# Patient Record
Sex: MALE | Employment: FULL TIME | URBAN - METROPOLITAN AREA
[De-identification: names, ages, dates, MRNs, and addresses within clinical notes are randomized per-mention and may not be internally consistent; named-entity substitution may affect disease eponyms.]

---

## 2020-05-27 ENCOUNTER — HOSPITAL ENCOUNTER (OUTPATIENT)
Dept: PHYSICAL THERAPY | Age: 30
Discharge: HOME | End: 2020-05-27
Payer: OTHER GOVERNMENT

## 2020-05-27 ENCOUNTER — OFFICE VISIT (OUTPATIENT)
Dept: ORTHOPEDIC SURGERY | Age: 30
End: 2020-05-27

## 2020-05-27 VITALS — WEIGHT: 210 LBS | HEIGHT: 75 IN | BODY MASS INDEX: 26.11 KG/M2 | TEMPERATURE: 96.9 F

## 2020-05-27 DIAGNOSIS — M25.562 LEFT KNEE PAIN, UNSPECIFIED CHRONICITY: ICD-10-CM

## 2020-05-27 DIAGNOSIS — M76.52 PATELLAR TENDONITIS OF LEFT KNEE: Primary | ICD-10-CM

## 2020-05-27 PROCEDURE — 97110 THERAPEUTIC EXERCISES: CPT

## 2020-05-27 PROCEDURE — 97161 PT EVAL LOW COMPLEX 20 MIN: CPT

## 2020-05-27 PROCEDURE — 97535 SELF CARE MNGMENT TRAINING: CPT

## 2020-05-27 RX ORDER — CELECOXIB 200 MG/1
200 CAPSULE ORAL 2 TIMES DAILY
Qty: 60 CAP | Refills: 0 | Status: SHIPPED | OUTPATIENT
Start: 2020-05-27

## 2020-05-27 NOTE — PROGRESS NOTES
PT DAILY TREATMENT NOTE/KNEE EVAL 10-18    Patient Name: Sherrie Devine  Date:2020  : 1990  [x]  Patient  Verified  Payor: Jono Blackburn / Plan: Lexi Vaughan 35 / Product Type: PPO /    In time:1038  Out time:1109  Total Treatment Time (min): 31  Visit #: 1 of 8    Treatment Area: Patellar tendinitis, left knee [M76.52]    SUBJECTIVE  Pain Level (0-10 scale): worst: 6/10, lowest: 1-2/10, currently: 210  [x]constant []intermittent []improving [x]worsening []no change since onset    Any medication changes, allergies to medications, adverse drug reactions, diagnosis change, or new procedure performed?: [x] No    [] Yes (see summary sheet for update)  Subjective functional status/changes:     PLOF: I with ADLs, driving, L hand dominate, full time working, physical activity daily (pool, stretches, cardio)  Limitations to PLOF: pain  Mechanism of Injury: 2020  Current symptoms/Complaints: training for [employer] started 2 months ago, L knee pain began with insidious onset after increased activity, osgood Schlatter when a kid, denies numbness/tingling, denies LBP, not keeping him up at night pain, sleeps on back, runs 2-4 miles (3-4x/week)   Aggravating: activity, knee bending, stairs (descent), lunges, squats, running (after run)  Alleviating: ice  Previous Treatment/Compliance: ice, medication  PMHx/Surgical Hx: hx of L osgood Schlatter   Work Hx: [employer]  Living Situation: apartment second floor, [address], girlfriend  Pt Goals: \"decrease pain\"  Barriers: []pain []financial []time []transportation []other  Motivation: good  Substance use: []Alcohol []Tobacco []other:   FABQ Score: []low []elevate  Cognition: A & O x 4    Other:    OBJECTIVE/EXAMINATION  Domestic Life: see above  Activity/Recreational Limitations: pain  Mobility: I   Self Care: I    15 min [x]Eval                  []Re-Eval       8 min Therapeutic Exercise:  [x] See flow sheet :   Rationale: increase ROM, increase strength and education on HEP to improve the patients ability to complete functional activities.     8 min Self care: education on decrease activity levels, education on rest day, education on ice/elevation to slowly begin implementing exercise gradually to return to prior level of function with decreased pain and to prevent future injuries           With   [x] TE   [] TA   [] neuro   [] other: Patient Education: [x] Review HEP    [] Progressed/Changed HEP based on:   [] positioning   [] body mechanics   [] transfers   [] heat/ice application    [] other:      Other Objective/Functional Measures:     Physical Therapy Evaluation - Knee    Gait:  [x] Normal    [] Abnormal    [] Antalgic    [] NWB    Device:    Describe:    ROM / Strength  [] Unable to assess                  Strength (1-5)    Left Right   Hip Flexion 5 5    Extension 4 4    Abduction 4 4+    Adduction 4+ 4+   Knee Flexion 4+ 5    Extension 4+ p 5   Ankle Plantarflexion 5 5    Dorsiflexion 5 5       Flexibility: [] Unable to assess at this time  Hamstrings:    (L) Tightness= [] WNL   [] Min   [x] Mod   [] Severe    (R) Tightness= [] WNL   [x] Min   [] Mod   [] Severe  Quadriceps:    (L) Tightness= [] WNL   [x] Min   [] Mod   [] Severe    (R) Tightness= [] WNL   [x] Min   [] Mod   [] Severe  Gastroc:      (L) Tightness= [] WNL   [] Min   [x] Mod   [] Severe    (R) Tightness= [] WNL   [] Min   [x] Mod   [] Severe  Other:    Palpation:   Neg/Pos  Neg/Pos  Neg/Pos   Joint Line - Quad tendon - Patellar ligament  + L   Patella - Fibular head - Pes Anserinus -   Tibial tubercle - Hamstring tendons - Infrapatellar fat pad      Optional Tests:  Patellar Positioning (Static)   []L [x]R Normal   Patellar Mobility WFL      Lachmans  [] Neg    [] Pos Posterior Drawer [x] Neg    [] Pos  Pivot Shift  [] Neg    [] Pos Posterior Sag  [] Neg    [] Pos  MARSHALL   [] Neg    [] Pos Andrei's Test [] Neg    [] Pos  ALRI   [] Neg    [] Pos Squat   [] Neg    [x] Pos  Valgus@ 0 Degrees [] Neg    [] Pos ZekeAlfredo [x] Neg    [] Pos  Valgus@ 30 Degrees [x] Neg    [] Pos Patellar Apprehension [] Neg    [] Pos  Varus@ 0 Degrees [] Neg    [] Pos Arthur's Compression [] Neg    [] Pos  Varus@ 30 Degrees [x] Neg    [] Pos Ely's Test  [] Neg    [] Pos  Apley's Compression [x] Neg    [] Pos Elo's Test  [] Neg    [] Pos  Apley's Distraction [] Neg    [] Pos Stroke Test  [] Neg    [] Pos   Anterior Drawer [x] Neg    [] Pos Fluctuation Test [] Neg    [] Pos  Other:                  [] Neg    [] Pos                 Other tests/comments: Thessaly: neg  Duck walk: neg  Air squat with knees aligned with ankles: felt better  SLS: 1x30\" ea no UE EO floor, increased sway noted on L & pain  Slight swelling noticed in patellar tendon region, no warmth felt  Clarkpiter: + R    Access Code: [email or web address]   URL: [email or web address]   Date: 05/27/2020   Prepared by: Darby Resendiz     Exercises   Supine Bridge - 10 reps - 2 sets - 1x daily - 3-5x weekly   Clamshell - 10 reps - 2 sets - 1x daily - 3-5x weekly   Sidelying Reverse Clamshell - 10 reps - 2 sets - 1x daily - 3-5x weekly   Gastroc Stretch on Wall - 3 reps - 1 sets - 30 hold - 1x daily - 7x weekly   Supine Quad Set - 10 reps - 2 sets - 5 hold - 1x daily - 3-5x weekly   Supine Isometric Hamstring Set - 10 reps - 2 sets - 5 hold - 1x daily - 3-5x weekly   Long Sitting Calf Stretch with Strap - 3 reps - 1 sets - 30 hold - 1x daily - 7x weekly   Soleus Stretch on Wall - 3 reps - 1 sets - 30 hold - 1x daily - 7x weekly   Standing Hamstring Stretch on Chair - 3 reps - 1 sets - 30 hold - 1x daily - 7x weekly   Seated Hamstring Stretch - 3 reps - 1 sets - 30 hold - 1x daily - 7x weekly   Small Range Straight Leg Raise - 10 reps - 2 sets - 1x daily - 7x weekly   Sidelying Hip Abduction - 10 reps - 2 sets - 1x daily - 7x weekly   Prone Hip Extension - 10 reps - 2 sets - 1x daily - 7x weekly   Standing ITB Stretch - 3 reps - 1 sets - 30 hold - 1x daily - 7x weekly   Patient Education   Acute Knee Pain   Ice       Pain Level (0-10 scale) post treatment: \"worse\"    ASSESSMENT/Changes in Function: Pt is a 35 y/o male presenting to outpatient physical therapy with complaints of L knee pain that began with insidious onset after increased physical activity 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 After PT evaluation, findings and symptoms are consistent with patellar tendonitis. Pt is a good candidate for skilled PT interventions with plan to progress and modify therapeutic interventions to address prevention interventions, stabilization training, plymometric training, strength deficits, and apatient education to increase patient's ability to complete functional and community activities with decreased pain. Written HEP was completed and hardcopy was given to patient to complete daily at home; pt verbalized understanding. Discussed decreasing activity levels and taking one full rest day to recover and slowly implementing activity gradually; pt voiced understanding. [x]  See Plan of Care  []  See progress note/recertification  []  See Discharge Summary         Progress towards goals / Updated goals:   Short term goals to be completed in 2 weeks  1. Pt will be compliant and I with HEP to help reduce pain and improve abilities to complete ADLs. EVAL: written hardcopy given and initiated exercises   CURRENT:  2. Pt will report no higher than 4/10 pain to improve patient's ability to complete household chores   EVAL: worst 6/10   CURRENT:    Long term goals to be completed in 4 weeks  1. Pt will demonstrate 66 FOTO score to improve abilities to functional activities. EVAL: 58   CURRENT:  2. Pt will report no higher than 1/10 pain to improve patient's ability to complete job related task   EVAL: worst 6/10   CURRENT:  3.  Pt will demonstrate a proper air squat 10 reps with decrease anterior translation of tibia to increase abilities to complete community activities. EVAL: educated on proper form with fair return demo 1x   CURRENT:  4.  Pt will report no increase in pain levels during/after 2 mile run to increase abilities to complete weekly fitness test.   EVAL pain after running and pushing through runs despite pain   CURRENT:     PLAN  [x]  Upgrade activities as tolerated     [x]  Continue plan of care  [x]  Update interventions per flow sheet       []  Discharge due to:_  []  Other:_      Fredy Salas 5/27/2020  10:37 AM

## 2020-05-27 NOTE — PROGRESS NOTES
[facility]  [address]  [place], [address]  [phone] fax    Plan of Care/ Statement of Necessity for Physical Therapy Services    Patient name: Nacho Strange Start of Care: 2020   Referral source: Arely Crenshaw,* : 1990    Medical Diagnosis: Patellar tendinitis, left knee [M76.52]  Payor: TRIP MICHEL / Plan: Lexi Vaughan 35 / Product Type: PPO /  Onset Date:2020    Treatment Diagnosis: L knee pain   Prior Hospitalization: see medical history Provider#: [id number]   Medications: Verified on Patient summary List    Comorbidities: hx of L osgood Schlatter    Prior Level of Function:  I with ADLs, driving, L hand dominate, full time working, physical activity daily (pool, stretches, cardio)     The Plan of Care and following information is based on the information from the initial evaluation. 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 After PT evaluation, findings and symptoms are consistent with patellar tendonitis. Pt is a good candidate for skilled PT interventions with plan to progress and modify therapeutic interventions to address prevention interventions, stabilization training, plymometric training, strength deficits, and apatient education to increase patient's ability to complete functional and community activities with decreased pain. Written HEP was completed and hardcopy was given to patient to complete daily at home; pt verbalized understanding. Discussed decreasing activity levels and taking one full rest day to recover and slowly implementing activity gradually; pt voiced understanding.   Evaluation Complexity History MEDIUM  Complexity : 1-2 comorbidities / personal factors will impact the outcome/ POC ; Examination MEDIUM Complexity : 3 Standardized tests and measures addressing body structure, function, activity limitation and / or participation in recreation  ;Presentation LOW Complexity : Stable, uncomplicated  ;Clinical Decision Making MEDIUM Complexity : FOTO score of 26-74  Overall Complexity Rating: LOW   Problem List: pain affecting function, decrease strength, impaired gait/ balance, decrease ADL/ functional abilitiies, decrease activity tolerance and decrease flexibility/ joint mobility   Treatment Plan may include any combination of the following: Therapeutic exercise, Therapeutic activities, Neuromuscular re-education, Physical agent/modality, Gait/balance training, Manual therapy, Patient education, Self Care training, Functional mobility training, Home safety training and Stair training  Patient / Family readiness to learn indicated by: asking questions, trying to perform skills and interest  Persons(s) to be included in education: patient (P)  Barriers to Learning/Limitations: None  Patient Goal (s): decrease pain  Patient Self Reported Health Status: excellent  Rehabilitation Potential: excellent    Short term goals to be completed in 2 weeks  1. Pt will be compliant and I with HEP to help reduce pain and improve abilities to complete ADLs. EVAL: written hardcopy given and initiated exercises              CURRENT:  2. Pt will report no higher than 4/10 pain to improve patient's ability to complete household chores              EVAL: worst 6/10              CURRENT:     Long term goals to be completed in 4 weeks  1. Pt will demonstrate 66 FOTO score to improve abilities to functional activities. EVAL: 58              CURRENT:  2. Pt will report no higher than 1/10 pain to improve patient's ability to complete job related task              EVAL: worst 6/10              CURRENT:  3.  Pt will demonstrate a proper air squat 10 reps with decrease anterior translation of tibia to increase abilities to complete community activities. EVAL: educated on proper form with fair return demo 1x              CURRENT:  4. Pt will report no increase in pain levels during/after 2 mile run to increase abilities to complete weekly fitness test.              EVAL pain after running and pushing through runs despite pain              CURRENT:    Frequency / Duration: Patient to be seen 2-3 times per week for 4 weeks. Patient/ Caregiver education and instruction: Diagnosis, prognosis, self care, activity modification and exercises   [x]  Plan of care has been reviewed with ELVIA James 5/27/2020 11:17 AM    ________________________________________________________________________    I certify that the above Therapy Services are being furnished while the patient is under my care. I agree with the treatment plan and certify that this therapy is necessary.     Physician's Signature:____________Date:_________TIME:________    ** Signature, Date and Time must be completed for valid certification **    Please sign and return to [facility] [address]  [address]  [place], [address]  [phone] fax

## 2020-05-27 NOTE — PROGRESS NOTES
Shruthi Barrera  1990   Chief Complaint   Patient presents with    Knee Pain     left knee pain        HISTORY OF PRESENT ILLNESS  Shruthi Barrera is a 34 y.o. male who presents today for evaluation of left knee pain. He rates his pain 3/10 today. Pain has been present for about 1 month. He has been training for [employer] and [employer]. He states the program starts in July. Pt has been doing a lot of running, lunges, squats, swimming, lifting. Pain with stairs, lunges. Has tried icing his knee. Patient describes the pain as throbbing and grinding, popping, stiff that is Intermittent in nature. Symptoms are worse with stairs, Activity and is better with  Ice. Associated symptoms include grinding and hamstring tightness. Since problem started, it: is unchanged. Pain does not wake patient up at night. Has taken no meds for the problem. Has tried following treatments: Injections:NO; Brace:NO; Therapy:NO; Cane/Crutch:NO       No Known Allergies     History reviewed. No pertinent past medical history.    Social History     Socioeconomic History    Marital status: UNKNOWN     Spouse name: Not on file    Number of children: Not on file    Years of education: Not on file    Highest education level: Not on file   Occupational History    Not on file   Social Needs    Financial resource strain: Not on file    Food insecurity     Worry: Not on file     Inability: Not on file    Transportation needs     Medical: Not on file     Non-medical: Not on file   Tobacco Use    Smoking status: Never Smoker    Smokeless tobacco: Never Used   Substance and Sexual Activity    Alcohol use: Not on file    Drug use: Not on file    Sexual activity: Not on file   Lifestyle    Physical activity     Days per week: Not on file     Minutes per session: Not on file    Stress: Not on file   Relationships    Social connections     Talks on phone: Not on file     Gets together: Not on file     Attends Congregation service: Not on file     Active member of club or organization: Not on file     Attends meetings of clubs or organizations: Not on file     Relationship status: Not on file    Intimate partner violence     Fear of current or ex partner: Not on file     Emotionally abused: Not on file     Physically abused: Not on file     Forced sexual activity: Not on file   Other Topics Concern    Not on file   Social History Narrative    Not on file      History reviewed. No pertinent surgical history. History reviewed. No pertinent family history. No current outpatient medications on file. No current facility-administered medications for this visit. REVIEW OF SYSTEM   Patient denies: Weight loss, Fever/Chills, HA, Visual changes, Fatigue, Chest pain, SOB, Abdominal pain, N/V/D/C, Blood in stool or urine, Edema. Pertinent positive as above in HPI. All others were negative    PHYSICAL EXAM:   Visit Vitals  Temp 96.9 °F (36.1 °C) (Oral)   Ht 6' 3\" (1.905 m)   Wt 210 lb (95.3 kg)   BMI 26.25 kg/m²     The patient is a well-developed, well-nourished male   in no acute distress. The patient is alert and oriented times three. The patient is alert and oriented times three. Mood and affect are normal.  LYMPHATIC: lymph nodes are not enlarged and are within normal limits  SKIN: normal in color and non tender to palpation. There are no bruises or abrasions noted. NEUROLOGICAL: Motor sensory exam is within normal limits. Reflexes are equal bilaterally.  There is normal sensation to pinprick and light touch  MUSCULOSKELETAL:   Examination Left knee   Skin Intact   Range of motion 0-130   Effusion -   Medial joint line tenderness -   Lateral joint line tenderness -   Tenderness Pes Bursa -   Tenderness insertion MCL -   Tenderness insertion LCL -   Stormys -   Patella crepitus -   Patella grind -   Lachman -   Pivot shift -   Anterior drawer -   Posterior drawer -   Varus stress -   Valgus stress -   Neurovascular Intact Calf Swelling and Tenderness to Palpation -   Iliana's Test -   Hamstring Cord Tightness  significant   Marked tenderness over the inferior pole of the patella    IMAGING: XR of left knee obtained in the office dated 5/27/2020 was reviewed and read by Dr. Alvarez Males: Evidence of old Osgood-Schlatter's disease in tibial tubercle       IMPRESSION:      ICD-10-CM ICD-9-CM    1. Patellar tendonitis of left knee M76.52 726.64 REFERRAL TO PHYSICAL THERAPY      celecoxib (CeleBREX) 200 mg capsule   2. Left knee pain, unspecified chronicity M25.562 719.46 AMB POC XRAY, KNEE; 1/2 VIEWS        PLAN:  1. Pt presents today with left knee pain due to patellar tendonitis and I would like for him to try a few sessions of PT. He was also given hamstring stretches and a prescription for Celebrex in the office today. I would like to see him back in the office in 2 weeks. Will consider getting an MRI if pain persists. I advised him that he may need to modify his activities over the next week. Risk factors include: n/a  2. No ultrasound exam indicated today  3. No cortisone injection indicated today   4. Yes Physical/Occupational Therapy indicated today  5. No diagnostic test indicated today:   6. No durable medical equipment indicated today  7. No referral indicated today   8. Yes medications indicated today: CELEBREX  9. No Narcotic indicated today        RTC 2 weeks       Scribed by Olga Ag [address]) as dictated by Grant Small MD    I, Dr. Grant Small, confirm that all documentation is accurate.     Grant Small M.D.   [facility]

## 2020-05-29 ENCOUNTER — HOSPITAL ENCOUNTER (OUTPATIENT)
Dept: PHYSICAL THERAPY | Age: 30
Discharge: HOME | End: 2020-05-29
Payer: OTHER GOVERNMENT

## 2020-05-29 PROCEDURE — 97110 THERAPEUTIC EXERCISES: CPT

## 2020-05-29 NOTE — PROGRESS NOTES
PT DAILY TREATMENT NOTE 10-18    Patient Name: Raji Gross  Date:2020  : 1990  [x]  Patient  Verified  Payor: Law Oliva / Plan: Lexi Vaughan 35 / Product Type: PPO /    In time: 2:58  Out time:3:55  Total Treatment Time (min): 62  Visit #: 2 of     Treatment Area: Patellar tendinitis, left knee [M76.52]    SUBJECTIVE  Pain Level (0-10 scale): 2/10  Any medication changes, allergies to medications, adverse drug reactions, diagnosis change, or new procedure performed?: [x] No    [] Yes (see summary sheet for update)  Subjective functional status/changes:   [] No changes reported  The patient reports that his knee is doing fairly well. He is having low grade pain upon arrival, but it has felt better since he has not been running lately. OBJECTIVE  Modality rationale: decrease edema, decrease inflammation and decrease pain to improve the patients ability to improve ADL ease. Min Type Additional Details   10 [x]  Vasopneumatic Device Pressure:       [x] lo [] med [] hi   Temperature: [x] lo [] med [] hi   [] Skin assessment post-treatment:  []intact []redness- no adverse reaction    []redness  adverse reaction:     28 min Therapeutic Exercise:  [x] See flow sheet :   Rationale: increase ROM and increase strength to improve the patients ability to improve ADL ease. 15 min Neuromuscular Re-education:  [x]  See flow sheet : core align exercises    Rationale: increase ROM and increase strength  to improve the patients ability to improve ADL ease. 3 min Manual Therapy:  Gentle graston to patellar ligament at 90 degrees of flexion   Rationale: decrease pain, increase ROM and increase tissue extensibility to improve ADL ease.        With   [] TE   [] TA   [] neuro   [] other: Patient Education: [x] Review HEP    [] Progressed/Changed HEP based on:   [] positioning   [] body mechanics   [] transfers   [] heat/ice application    [] other:      Other Objective/Functional Measures:       Pain Level (0-10 scale) post treatment: 2/10    ASSESSMENT/Changes in Function: Initiated treatment, did require cues to stay out of deep squats which provocate pain as well as limited deep eccentric catch of landing plyometrics which at times per reported to be painful. Patient will continue to benefit from skilled PT services to modify and progress therapeutic interventions, address functional mobility deficits, address ROM deficits, address strength deficits, analyze and address soft tissue restrictions, analyze and cue movement patterns, analyze and modify body mechanics/ergonomics, assess and modify postural abnormalities and instruct in home and community integration to attain remaining goals. []  See Plan of Care  []  See progress note/recertification  []  See Discharge Summary         Progress towards goals / Updated goals:  Short term goals to be completed in 2 weeks  1. Pt will be compliant and I with HEP to help reduce pain and improve abilities to complete ADLs.             EVAL: written hardcopy given and initiated exercises              CURRENT: Met - reports compliance   2. Pt will report no higher than 4/10 pain to improve patient's ability to complete household chores              EVAL: worst 6/10              CURRENT:     Long term goals to be completed in 4 weeks  1. Pt will demonstrate 78 FOTO score to improve abilities to functional activities.             EVAL: 58              CURRENT:  2. Pt will report no higher than 1/10 pain to improve patient's ability to complete job related task              EVAL: worst 6/10              CURRENT:  3. Pt will demonstrate a proper air squat 10 reps with decrease anterior translation of tibia to increase abilities to complete community activities.             EVAL: educated on proper form with fair return demo 1x              CURRENT:  4.  Pt will report no increase in pain levels during/after 2 mile run to increase abilities to complete weekly fitness test.              EVAL pain after running and pushing through runs despite pain              CURRENT:      PLAN  []  Upgrade activities as tolerated     [x]  Continue plan of care  []  Update interventions per flow sheet       []  Discharge due to:_  []  Other:_      Izaiah Clemente, PT 5/29/2020  3:20 PM    Future Appointments   Date Time Provider Keli Travis   6/1/2020  2:30 PM Anu Tadeo, PT [facility]   6/3/2020  2:45 PM Yasmeen Green [facility]   6/8/2020  2:45 PM Tera Hogan, PT [facility]   6/10/2020  2:20 PM MD Ludwin Barrera [address]   6/10/2020  3:30 PM Anu Tadeo, PT [facility]   6/15/2020  2:45 PM Tera Hogan, PT [facility]   6/17/2020  2:45 PM Pérez Augustin [facility]

## 2020-06-03 ENCOUNTER — HOSPITAL ENCOUNTER (OUTPATIENT)
Dept: PHYSICAL THERAPY | Age: 30
Discharge: HOME | End: 2020-06-03
Payer: OTHER GOVERNMENT

## 2020-06-03 PROCEDURE — 97112 NEUROMUSCULAR REEDUCATION: CPT

## 2020-06-03 PROCEDURE — 97530 THERAPEUTIC ACTIVITIES: CPT

## 2020-06-03 PROCEDURE — 97016 VASOPNEUMATIC DEVICE THERAPY: CPT

## 2020-06-03 NOTE — PROGRESS NOTES
PT DAILY TREATMENT NOTE 10-18    Patient Name: Michael Walker  Date:6/3/2020  : 1990  [x]  Patient  Verified  Payor: TRIP MICHEL / Plan: Lexi Vaughan 35 / Product Type: PPO /    In time:237  Out time:337  Total Treatment Time (min): 60  Visit #: 3 of     Treatment Area: Patellar tendinitis, left knee [M76.52]    SUBJECTIVE  Pain Level (0-10 scale): 5  Any medication changes, allergies to medications, adverse drug reactions, diagnosis change, or new procedure performed?: [x] No    [] Yes (see summary sheet for update)  Subjective functional status/changes:   [] No changes reported  Pt reports that he backed down his workout routine and included more rest; however he had to run his PRT Monday and his knee has been sore since. He has been elevating and icing at home and says swelling is coming down. OBJECTIVE    Modality rationale: decrease inflammation and decrease pain to improve the patients ability to complete functional activities.    Min Type Additional Details    [] Estim:  []Unatt       []IFC  []Premod                        []Other:  []w/ice   []w/heat  Position:  Location:    [] Estim: []Att    []TENS instruct  []NMES                    []Other:  []w/US   []w/ice   []w/heat  Position:  Location:    []  Traction: [] Cervical       []Lumbar                       [] Prone          []Supine                       []Intermittent   []Continuous Lbs:  [] before manual  [] after manual    []  Ultrasound: []Continuous   [] Pulsed                           []1MHz   []3MHz W/cm2:  Location:    []  Iontophoresis with dexamethasone         Location: [] Take home patch   [] In clinic    []  Ice     []  heat  []  Ice massage  []  Laser   []  Anodyne Position:  Location:    []  Laser with stim  []  Other:  Position:  Location:   15 [x]  Vasopneumatic Device  Semi reclined  L knee Pressure:       [] lo [] med [x] hi   Temperature: [x] lo [] med [] hi   [x] Skin assessment post-treatment:  [x]intact []redness- no adverse reaction    []redness  adverse reaction:     15 min Therapeutic Activity:  [x]  See flow sheet :   Rationale: increase ROM, increase strength, improve coordination and increase proprioception  to improve the patients ability to complete job related activities. 30 min Neuromuscular Re-education:  [x]  See flow sheet :   Rationale: increase strength, improve coordination, improve balance, increase proprioception and increase core activation  to improve the patients motor control and proprioceptive input to increase ability to activate correct muscles when performing functional task. With   [] TE   [x] TA   [x] neuro   [] other: Patient Education: [x] Review HEP    [] Progressed/Changed HEP based on:   [] positioning   [] body mechanics   [] transfers   [] heat/ice application    [] other:      Other Objective/Functional Measures:      Pain Level (0-10 scale) post treatment: 3-4    ASSESSMENT/Changes in Function: Pt is presenting with decreased stability in SL activities so interventions were focused on SL activities. Pt tends to collapse laterally with lateral lunges and SL deadlifts with corrections noted with verbal cueing. Added additional exercises to incorporate core training with good tolerance. Verbal cues and demonstration required to use correct body mechanics. Encouraged pt to complete HEP daily to help to continue to progress PT interventions to improve patient's ability to complete functional and community task independently. At the conclusion of the session, pt reported decreased pain.     Patient will continue to benefit from skilled PT services to modify and progress therapeutic interventions, address functional mobility deficits, address strength deficits, analyze and address soft tissue restrictions, analyze and cue movement patterns, analyze and modify body mechanics/ergonomics, assess and modify postural abnormalities and instruct in home and community integration to attain remaining goals. [x]  See Plan of Care  []  See progress note/recertification  []  See Discharge Summary         Progress towards goals / Updated goals:  Short term goals to be completed in 2 weeks  1. Pt will be compliant and I with HEP to help reduce pain and improve abilities to complete ADLs.             EVAL: written hardcopy given and initiated exercises              CURRENT: Met - reports compliance   2. Pt will report no higher than 4/10 pain to improve patient's ability to complete household chores              EVAL: worst 6/10              CURRENT: 5/10 6/3/20     Long term goals to be completed in 4 weeks  1. Pt will demonstrate 78 FOTO score to improve abilities to functional activities.             EVAL: 58              CURRENT:  2. Pt will report no higher than 1/10 pain to improve patient's ability to complete job related task              EVAL: worst 6/10              CURRENT:  3. Pt will demonstrate a proper air squat 10 reps with decrease anterior translation of tibia to increase abilities to complete community activities.             EVAL: educated on proper form with fair return demo 1x              CURRENT: improving slight translation 6/3/20  4.  Pt will report no increase in pain levels during/after 2 mile run to increase abilities to complete weekly fitness test.              EVAL pain after running and pushing through runs despite pain              CURRENT: pain that night after 2 mile run 5/10 6/3/20    PLAN  []  Upgrade activities as tolerated     [x]  Continue plan of care  []  Update interventions per flow sheet       []  Discharge due to:_  []  Other:_      Fredy Caulk 6/3/2020  2:39 PM    Future Appointments   Date Time Provider Keli Delgadoi   6/3/2020  2:45 PM Selwyn Green [facility]   6/8/2020  2:45 PM Zayra Hogan, PT [facility] [place]   6/10/2020  2:20 PM MD Isidoro Caballero   6/10/2020  3:30 PM Rufina Goddard, PT [facility] [place]   6/15/2020 2:45 PM Carlee Hogan, PT [facility]   6/17/2020  2:45 PM El Hernandez [facility]

## 2020-06-08 ENCOUNTER — HOSPITAL ENCOUNTER (OUTPATIENT)
Dept: PHYSICAL THERAPY | Age: 30
Discharge: HOME | End: 2020-06-08
Payer: OTHER GOVERNMENT

## 2020-06-08 PROCEDURE — 97530 THERAPEUTIC ACTIVITIES: CPT

## 2020-06-08 PROCEDURE — 97016 VASOPNEUMATIC DEVICE THERAPY: CPT

## 2020-06-08 PROCEDURE — 97112 NEUROMUSCULAR REEDUCATION: CPT

## 2020-06-08 NOTE — PROGRESS NOTES
PT DAILY TREATMENT NOTE 10-18    Patient Name: Cesar Barrios  Date:2020  : 1990  [x]  Patient  Verified  Payor: TRIP MICHEL / Plan: Lexi Vaughan 35 / Product Type: PPO /    In time:2:42  Out time:3:37  Total Treatment Time (min): 55  Visit #: 4 of     Medicare/BCBS Only   Total Timed Codes (min):   1:1 Treatment Time:         Treatment Area: Patellar tendinitis, left knee [M76.52]    SUBJECTIVE  Pain Level (0-10 scale): 2-3/10  Any medication changes, allergies to medications, adverse drug reactions, diagnosis change, or new procedure performed?: [x] No    [] Yes (see summary sheet for update)  Subjective functional status/changes:   [] No changes reported  \"The knee feels pretty good. I had to run this morning so its a little sore but not too bad. \"    OBJECTIVE    Modality rationale: decrease inflammation and decrease pain to improve the patients ability to reduce post therapy soreness   Min Type Additional Details    [] Estim:  []Unatt       []IFC  []Premod                        []Other:  []w/ice   []w/heat  Position:  Location:    [] Estim: []Att    []TENS instruct  []NMES                    []Other:  []w/US   []w/ice   []w/heat  Position:  Location:    []  Traction: [] Cervical       []Lumbar                       [] Prone          []Supine                       []Intermittent   []Continuous Lbs:  [] before manual  [] after manual    []  Ultrasound: []Continuous   [] Pulsed                           []1MHz   []3MHz W/cm2:  Location:    []  Iontophoresis with dexamethasone         Location: [] Take home patch   [] In clinic    []  Ice     []  heat  []  Ice massage  []  Laser   []  Anodyne Position:  Location:    []  Laser with stim  []  Other:  Position:  Location:   10 []  Vasopneumatic Device Pressure:       [] lo [x] med [] hi   Temperature: [x] lo [] med [] hi   [x] Skin assessment post-treatment:  [x]intact [x]redness- no adverse reaction    []redness  adverse reaction:     15 min Therapeutic Activity:  [] See flow sheet :   Rationale: increase strength, improve coordination and increase proprioception to improve the patients ability to improve ease of performing job related tasks without difficulty or discomfort    30 min Neuromuscular Re-education:  []  See flow sheet :   Rationale: increase strength, improve coordination, improve balance and increase proprioception  to improve the patients ability to increase LE stability, improve dynamic standing balance, return to pain free running          With   [] TE   [] TA   [] neuro   [] other: Patient Education: [x] Review HEP    [] Progressed/Changed HEP based on:   [] positioning   [] body mechanics   [] transfers   [] heat/ice application    [] other:      Other Objective/Functional Measures: Added march hold with lateral lunges. Added single leg bridges on swiss ball. Pain Level (0-10 scale) post treatment: 2/10    ASSESSMENT/Changes in Function: Pt is steadily progressing with skilled therapy. No valgus collapse appreciated with single leg activities and squatting today. Pt able to perform air squats with proper form without anterior translation of tibia noted today x 10 repetitions. Pt notes pain levels getting no greater than 4-5/10 now and duration of pain is decreasing after pain onset. Patient will continue to benefit from skilled PT services to address functional mobility deficits, address ROM deficits, address strength deficits, analyze and address soft tissue restrictions, analyze and cue movement patterns, analyze and modify body mechanics/ergonomics, assess and modify postural abnormalities, address imbalance/dizziness and instruct in home and community integration to attain remaining goals. []  See Plan of Care  []  See progress note/recertification  []  See Discharge Summary         Progress towards goals / Updated goals:  Short term goals to be completed in 2 weeks  1.  Pt will be compliant and I with HEP to help reduce pain and improve abilities to complete ADLs.             EVAL: written hardcopy given and initiated exercises              CURRENT: Met - reports compliance 6/8/20  2. Pt will report no higher than 4/10 pain to improve patient's ability to complete household chores              EVAL: worst 6/10              CURRENT: progressing - 4-5/10 6/8/20     Long term goals to be completed in 4 weeks  1. Pt will demonstrate 78 FOTO score to improve abilities to functional activities.             EVAL: 62              CURRENT: reassess at MD note  2. Pt will report no higher than 1/10 pain to improve patient's ability to complete job related task              EVAL: worst 6/10              CURRENT:  3. Pt will demonstrate a proper air squat 10 reps with decrease anterior translation of tibia to increase abilities to complete community activities.             EVAL: educated on proper form with fair return demo 1x              CURRENT: progressing - no anterior translation noted today 6/8/20  4.  Pt will report no increase in pain levels during/after 2 mile run to increase abilities to complete weekly fitness test.              EVAL pain after running and pushing through runs despite pain              CURRENT: progressing - pain that night after 2 mile run 2-3/10 6/8/20    PLAN  [x]  Upgrade activities as tolerated     [x]  Continue plan of care  []  Update interventions per flow sheet       []  Discharge due to:_  []  Other:_      Tatum Hogan, PT 6/8/2020  2:44 PM    Future Appointments   Date Time Provider Keli Travis   6/8/2020  2:45 PM Tatum Hogan, PT [facility]   6/10/2020  2:20 PM Griselda Taylor MD [facility]   6/10/2020  3:30 PM Maximiliano Adame PT [facility]   6/15/2020  2:45 PM Tatum Hogan, PT [facility]   6/17/2020  2:45 PM Destiny Leahy [facility]

## 2020-06-10 ENCOUNTER — OFFICE VISIT (OUTPATIENT)
Dept: ORTHOPEDIC SURGERY | Age: 30
End: 2020-06-10

## 2020-06-10 ENCOUNTER — HOSPITAL ENCOUNTER (OUTPATIENT)
Dept: PHYSICAL THERAPY | Age: 30
Discharge: HOME | End: 2020-06-10
Payer: OTHER GOVERNMENT

## 2020-06-10 VITALS — WEIGHT: 208.2 LBS | HEIGHT: 75 IN | TEMPERATURE: 97.7 F | BODY MASS INDEX: 25.89 KG/M2

## 2020-06-10 DIAGNOSIS — M76.52 PATELLAR TENDONITIS OF LEFT KNEE: Primary | ICD-10-CM

## 2020-06-10 PROCEDURE — 97110 THERAPEUTIC EXERCISES: CPT

## 2020-06-10 PROCEDURE — 97112 NEUROMUSCULAR REEDUCATION: CPT

## 2020-06-10 NOTE — PROGRESS NOTES
PT DAILY TREATMENT NOTE 10-18    Patient Name: Juma Gutiérrez  Date:6/10/2020  : 1990  [x]  Patient  Verified  Payor: Paxton Friday / Plan: Lexi Vaughan 35 / Product Type: PPO /    In time:3:30  Out time:4:32  Total Treatment Time (min): 62  Visit #: 5 of     Medicare/BCBS Only   Total Timed Codes (min):  52 1:1 Treatment Time:  52       Treatment Area: Patellar tendinitis, left knee [M76.52]    SUBJECTIVE  Pain Level (0-10 scale): 2/10  Any medication changes, allergies to medications, adverse drug reactions, diagnosis change, or new procedure performed?: [x] No    [] Yes (see summary sheet for update)  Subjective functional status/changes:   [] No changes reported  The patient indicates that his patellofemoral joint is actually doing better even after a 3 mile run today, but is feeling it around (points to his tibial tuberosity), which he indicates is a new pain. OBJECTIVE  Modality rationale: decrease edema, decrease inflammation and decrease pain to improve the patients ability to improve ADL ease. Min Type Additional Details   10 [x]  Vasopneumatic Device Pressure:       [x] lo [] med [] hi   Temperature: [x] lo [] med [] hi   [] Skin assessment post-treatment:  []intact []redness- no adverse reaction    []redness  adverse reaction:      14 min Therapeutic Exercise:  [x] See flow sheet :   Rationale: increase ROM and increase strength to improve the patients ability to improve ADL ease. 38 min Neuromuscular Re-education:  [x]  See flow sheet :   Rationale: increase ROM and increase strength  to improve the patients ability to improve ADL ease.       With   [] TE   [] TA   [] neuro   [] other: Patient Education: [x] Review HEP    [] Progressed/Changed HEP based on:   [] positioning   [] body mechanics   [] transfers   [] heat/ice application    [] other:      Other Objective/Functional Measures:      Pain Level (0-10 scale) post treatment: 3/10    ASSESSMENT/Changes in Function: Continues to demonstrate improved knee alignment with squatting and single leg pistol squats noting improved hip stability. He did present with slightly increased discomfort through his tibial tuberosity post session today, advised patient to not progress into his jog until cessation of discomfort. The patient verbalized understanding. Patient will continue to benefit from skilled PT services to modify and progress therapeutic interventions, address functional mobility deficits, address ROM deficits, address strength deficits, analyze and address soft tissue restrictions, analyze and cue movement patterns, analyze and modify body mechanics/ergonomics, assess and modify postural abnormalities and instruct in home and community integration to attain remaining goals. []  See Plan of Care  []  See progress note/recertification  []  See Discharge Summary         Progress towards goals / Updated goals:  Short term goals to be completed in 2 weeks  1. Pt will be compliant and I with HEP to help reduce pain and improve abilities to complete ADLs.             EVAL: written hardcopy given and initiated exercises              CURRENT: Met - reports compliance 6/8/20  2. Pt will report no higher than 4/10 pain to improve patient's ability to complete household chores              EVAL: worst 6/10              CURRENT: progressing - 4-5/10 6/8/20     Long term goals to be completed in 4 weeks  1. Pt will demonstrate 78 FOTO score to improve abilities to functional activities.             EVAL: 62              CURRENT: reassess at MD note  2. Pt will report no higher than 1/10 pain to improve patient's ability to complete job related task              EVAL: worst 6/10              CURRENT:  3. Pt will demonstrate a proper air squat 10 reps with decrease anterior translation of tibia to increase abilities to complete community activities.               EVAL: educated on proper form with fair return demo 1x              CURRENT: Met - no anterior translation appreciated 6/10/2020  4.  Pt will report no increase in pain levels during/after 2 mile run to increase abilities to complete weekly fitness test.              EVAL pain after running and pushing through runs despite pain              CURRENT: progressing - pain that night after 2 mile run 2-3/10 6/8/20     PLAN  []  Upgrade activities as tolerated     [x]  Continue plan of care  []  Update interventions per flow sheet       []  Discharge due to:_  []  Other:_      Papo Higuera, PT 6/10/2020  3:48 PM    Future Appointments   Date Time Provider Keli Travis   6/15/2020  2:45 PM Stefani Hogan, PT [facility]   6/17/2020  2:45 PM Pedro Lynn [facility]

## 2020-06-10 NOTE — PROGRESS NOTES
Cricket Smith  1990   Chief Complaint   Patient presents with    Knee Pain     Left knee pain         HISTORY OF PRESENT ILLNESS  Cricket Smith is a 34 y.o. male who presents today for reevaluation of left knee. Patient rates pain as 2/10 today. Pain has been present for over 1 month. He has been training for [employer] and [employer]. He states the program starts in July. Has been going to PT and has been doing HEP with relief. He notes improvement in his pain since last OV. States he has cut back on running. Has been taking Celebrex with relief in swelling. Patient denies any fever, chills, chest pain, shortness of breath or calf pain. The remainder of the review of systems is negative. There are no new illness or injuries to report since last seen in the office. There are no changes to medications, allergies, family or social history. Pain Assessment  6/10/2020   Location of Pain Knee   Location Modifiers Left   Severity of Pain 2   Quality of Pain Dull;Aching   Quality of Pain Comment -   Duration of Pain Persistent   Frequency of Pain Constant   Aggravating Factors (No Data)   Aggravating Factors Comment Running, daily activities    Relieving Factors Rest;Ice;Heat;Elevation; Exercises   Relieving Factors Comment PT   Result of Injury No       PHYSICAL EXAM:   Visit Vitals  Temp 97.7 °F (36.5 °C) (Oral)   Ht 6' 3\" (1.905 m)   Wt 208 lb 3.2 oz (94.4 kg)   BMI 26.02 kg/m²     The patient is a well-developed, well-nourished male   in no acute distress. The patient is alert and oriented times three. The patient is alert and oriented times three. Mood and affect are normal.  LYMPHATIC: lymph nodes are not enlarged and are within normal limits  SKIN: normal in color and non tender to palpation. There are no bruises or abrasions noted. NEUROLOGICAL: Motor sensory exam is within normal limits. Reflexes are equal bilaterally.  There is normal sensation to pinprick and light touch  MUSCULOSKELETAL:  Examination Left knee   Skin Intact   Range of motion 0-130   Effusion -   Medial joint line tenderness -   Lateral joint line tenderness -   Tenderness Pes Bursa -   Tenderness insertion MCL -   Tenderness insertion LCL -   Stormys -   Patella crepitus -   Patella grind -   Lachman -   Pivot shift -   Anterior drawer -   Posterior drawer -   Varus stress -   Valgus stress -   Neurovascular Intact   Calf Swelling and Tenderness to Palpation -   Iliana's Test -   Hamstring Cord Tightness  significant   Marked tenderness over the inferior pole of the patella      IMAGING: XR of left knee obtained in the office dated 5/27/2020 was reviewed and read by Dr. Palma Cox: Evidence of old Osgood-Schlatter's disease in tibial tubercle     IMPRESSION:      ICD-10-CM ICD-9-CM    1. Patellar tendonitis of left knee M76.52 726.64         PLAN:   1. Pt presents today with left knee pain due to patellar tendonitis that has improved with PT and Celebrex. He can continue with PT. Pt was inquiring about taping his knee today, PT can instruct on this. He can return as needed. Risk factors include: n/a  2. No ultrasound exam indicated today  3. No cortisone injection indicated today   4. No Physical/Occupational Therapy indicated today  5. No diagnostic test indicated today:   6. No durable medical equipment indicated today  7. No referral indicated today   8. No medications indicated today:   9. No Narcotic indicated today      RTC prn      Scribed by [facility] JettHildrethMichelle as dictated by Jean Paul Busch MD    I, Dr. Jean Paul Busch, confirm that all documentation is accurate.     Jean Paul Busch M.D.   [facility]

## 2020-06-15 ENCOUNTER — APPOINTMENT (OUTPATIENT)
Dept: PHYSICAL THERAPY | Age: 30
End: 2020-06-15
Payer: OTHER GOVERNMENT

## 2020-06-17 ENCOUNTER — APPOINTMENT (OUTPATIENT)
Dept: PHYSICAL THERAPY | Age: 30
End: 2020-06-17
Payer: OTHER GOVERNMENT

## 2020-07-21 NOTE — PROGRESS NOTES
[facility]  [address]  [place], [address]  [phone] fax    Physical Therapy Discharge Summary     Patient name: Brian Max Start of Care: 2020   Referral source: Harmony Bravo,* : 1990                Medical Diagnosis: Patellar tendinitis, left knee [M76.52]  Payor: TRIP MICHEL / Plan: Tyrone Jimenez / Product Type: PPO /  Onset Date:2020                Treatment Diagnosis: L knee pain   Prior Hospitalization: see medical history Provider#: [id number]   Medications: Verified on Patient summary List    Comorbidities: hx of L osgood Schlatter    Prior Level of Function:  I with ADLs, driving, L hand dominate, full time working, physical activity daily (pool, stretches, cardio)  Visits from Start of Care: 5    Missed Visits: 1  Reporting Period : 2020 to 6/10/2020    Summary of Care:  Goal: Pt will be compliant and I with HEP to help reduce pain and improve abilities to complete ADLs. Status at last note/certification: unable to reassess  Status at discharge: not met    Goal: Pt will report no higher than 4/10 pain to improve patient's ability to complete household chores  Status at last note/certification: unable to reassess  Status at discharge: not met    Goal: Pt will demonstrate 78 FOTO score to improve abilities to functional activities. Status at last note/certification: unable to reassess  Status at discharge: not met    Goal: Pt will report no higher than 1/10 pain to improve patient's ability to complete job related task  Status at last note/certification: unable to reassess  Status at discharge: not met    Goal: Pt will demonstrate a proper air squat 10 reps with decrease anterior translation of tibia to increase abilities to complete community activities.   Status at last note/certification: unable to reassess  Status at discharge: not met    Goal: Pt will report no increase in pain levels during/after 2 mile run to increase abilities to complete weekly fitness test.  Status at last note/certification: unable to reassess  Status at discharge: not met    ASSESSMENT/RECOMMENDATIONS:  Pt was seen for 5 therapy sessions. Per telephone log, the pt stated he would call back to schedule more appointments but he does not have any future therapy appointments scheduled. Pt is therefore d/c'ed from therapy and unable to reassess goals at this time. [x]Discontinue therapy: []Patient has reached or is progressing toward set goals      [x]Patient is non-compliant or has abdicated      []Due to lack of appreciable progress towards set goals    Enriqueta Bay, PT 7/21/2020 9:19 AM    NOTE TO PHYSICIAN:  Please complete the following and fax to: [facility] at [facility] at [phone]  . Retain this original for your records. If you are unable to process this request in   24 hours, please contact our office.      [] I have read the above report and request that my patient continue therapy with the following changes/special instructions:  [] I have read the above report and request that my patient be discharged from therapy    Physician's Signature:____________Date:_________TIME:________    ** Signature, Date and Time must be completed for valid certification **